# Patient Record
Sex: FEMALE | Race: BLACK OR AFRICAN AMERICAN | Employment: OTHER | URBAN - METROPOLITAN AREA
[De-identification: names, ages, dates, MRNs, and addresses within clinical notes are randomized per-mention and may not be internally consistent; named-entity substitution may affect disease eponyms.]

---

## 2018-07-24 ENCOUNTER — APPOINTMENT (OUTPATIENT)
Dept: GENERAL RADIOLOGY | Age: 58
End: 2018-07-24
Payer: MEDICARE

## 2018-07-24 ENCOUNTER — HOSPITAL ENCOUNTER (EMERGENCY)
Age: 58
Discharge: HOME | End: 2018-07-24
Attending: EMERGENCY MEDICINE
Payer: MEDICARE

## 2018-07-24 VITALS
TEMPERATURE: 97.9 F | HEIGHT: 64 IN | BODY MASS INDEX: 30.73 KG/M2 | OXYGEN SATURATION: 98 % | WEIGHT: 180 LBS | RESPIRATION RATE: 18 BRPM | HEART RATE: 64 BPM

## 2018-07-24 DIAGNOSIS — M79.601 RIGHT ARM PAIN: Primary | ICD-10-CM

## 2018-07-24 PROCEDURE — 73080 X-RAY EXAM OF ELBOW: CPT

## 2018-07-24 PROCEDURE — 99284 EMERGENCY DEPT VISIT MOD MDM: CPT

## 2018-07-24 PROCEDURE — 6370000000 HC RX 637 (ALT 250 FOR IP): Performed by: PHYSICIAN ASSISTANT

## 2018-07-24 PROCEDURE — 93971 EXTREMITY STUDY: CPT

## 2018-07-24 RX ORDER — OXYCODONE HYDROCHLORIDE AND ACETAMINOPHEN 5; 325 MG/1; MG/1
1 TABLET ORAL 2 TIMES DAILY
Qty: 10 TABLET | Refills: 0 | Status: SHIPPED | OUTPATIENT
Start: 2018-07-24 | End: 2018-07-29

## 2018-07-24 RX ORDER — NAPROXEN 500 MG/1
500 TABLET ORAL 2 TIMES DAILY WITH MEALS
Qty: 20 TABLET | Refills: 0 | Status: SHIPPED | OUTPATIENT
Start: 2018-07-24

## 2018-07-24 RX ORDER — OXYCODONE HYDROCHLORIDE AND ACETAMINOPHEN 5; 325 MG/1; MG/1
1 TABLET ORAL ONCE
Status: COMPLETED | OUTPATIENT
Start: 2018-07-24 | End: 2018-07-24

## 2018-07-24 RX ADMIN — OXYCODONE HYDROCHLORIDE AND ACETAMINOPHEN 1 TABLET: 5; 325 TABLET ORAL at 19:26

## 2018-07-24 ASSESSMENT — PAIN DESCRIPTION - ORIENTATION: ORIENTATION: RIGHT;MID;UPPER

## 2018-07-24 ASSESSMENT — PAIN SCALES - GENERAL
PAINLEVEL_OUTOF10: 10
PAINLEVEL_OUTOF10: 10

## 2018-07-24 ASSESSMENT — PAIN DESCRIPTION - LOCATION: LOCATION: ARM

## 2018-07-24 ASSESSMENT — PAIN DESCRIPTION - DESCRIPTORS: DESCRIPTORS: SHARP

## 2018-07-24 ASSESSMENT — PAIN DESCRIPTION - FREQUENCY: FREQUENCY: CONTINUOUS

## 2018-07-24 NOTE — ED NOTES
Right elbow pain past week denies injury concerned because \"I have and artificial heart valve\" no CP resp even non labored did not call cardiologist taking tylenol without relief no swelling redness or deformity to right arm     Estefany Marquez RN  07/24/18 4595

## 2018-07-25 NOTE — ED PROVIDER NOTES
TABLET    Take 1 tablet by mouth daily. SIMVASTATIN (ZOCOR) 20 MG TABLET    Take 1 tablet by mouth nightly. SPIRONOLACTONE (ALDACTONE) 25 MG TABLET    Take 25 mg by mouth daily. PAST MEDICAL HISTORY         Diagnosis Date    Acid reflux     Arthritis     Blurred vision     Chest pain     COMES AND GOES    CHF (congestive heart failure) (HCC)     Chronic low back pain     PAIN BLOCKS    Diverticulitis     Dizziness     Hyperlipidemia     Hypertension     Medically noncompliant     Mitral regurgitation     MR (mitral regurgitation) 14    valve surgery    Nausea     Numbness     LEFT HAND AND ARM    Rash     FINGERS    Snores     TR (tricuspid regurgitation) 14    valve surgery       SURGICAL HISTORY           Procedure Laterality Date    CARDIAC CATHETERIZATION      MITRAL VALVE SURGERY  14    Complex Mitral valve repair with jacinda-medial papillary muscle mobilization    OTHER SURGICAL HISTORY  14    mitral annuloplasty with 28 mm ATS semi-rigid ring    TRANSESOPHAGEAL ECHOCARDIOGRAM  2016    TRICUSPID VALVE SURGERY  14     Tricuspid valve reconstruction and annuloplasty with 32 mm Medtronic Contour ring    TUBAL LIGATION           FAMILY HISTORY       Family History   Problem Relation Age of Onset    Diabetes Mother     Heart Failure Mother         MI    Cancer Mother         ovarian    Diabetes Father     Stroke Father     Hypertension Father      Family Status   Relation Status    Mother Alive    Father         SOCIAL HISTORY      reports that she quit smoking about 4 years ago. She has never used smokeless tobacco. She reports that she drinks alcohol. She reports that she does not use drugs. REVIEW OF SYSTEMS    (2-9 systems for level 4, 10 or more for level 5)   Review of Systems    Except as noted above the remainder of the review of systems was reviewed and negative.      PHYSICAL EXAM    (up to 7 for level 4, 8 or more for examination by me reveals that it is appropriately placed and the right upper extremity is neuro/vasc intact. FINAL IMPRESSION      1. Right arm pain          DISPOSITION/PLAN   DISPOSITION Decision To Discharge 07/24/2018 08:36:20 PM      PATIENT REFERRED TO:   NADINE Yeh - CNP  [address] [address]  [phone]    In 1 day        DISCHARGE MEDICATIONS:     New Prescriptions    NAPROXEN (NAPROSYN) 500 MG TABLET    Take 1 tablet by mouth 2 times daily (with meals)    OXYCODONE-ACETAMINOPHEN (PERCOCET) 5-325 MG PER TABLET    Take 1 tablet by mouth 2 times daily for 5 days. .           (Please note that portions of this note were completed with a voice recognition program.  Efforts were made to edit the dictations but occasionally words are mis-transcribed.)    ANNA Bauman PA-C  07/24/18 2038       Claudette Hankins PA-C  07/24/18 2050

## 2021-09-19 ENCOUNTER — HOSPITAL ENCOUNTER (EMERGENCY)
Age: 61
Discharge: HOME | End: 2021-09-19
Attending: STUDENT IN AN ORGANIZED HEALTH CARE EDUCATION/TRAINING PROGRAM
Payer: MEDICARE

## 2021-09-19 VITALS
SYSTOLIC BLOOD PRESSURE: 140 MMHG | OXYGEN SATURATION: 97 % | BODY MASS INDEX: 39.27 KG/M2 | DIASTOLIC BLOOD PRESSURE: 74 MMHG | HEART RATE: 81 BPM | WEIGHT: 200 LBS | RESPIRATION RATE: 16 BRPM | HEIGHT: 60 IN | TEMPERATURE: 97.7 F

## 2021-09-19 DIAGNOSIS — B02.9 HERPES ZOSTER WITHOUT COMPLICATION: Primary | ICD-10-CM

## 2021-09-19 LAB
ABSOLUTE EOS #: 0.21 K/UL (ref 0–0.44)
ABSOLUTE IMMATURE GRANULOCYTE: 0.02 K/UL (ref 0–0.3)
ABSOLUTE LYMPH #: 1.57 K/UL (ref 1.1–3.7)
ABSOLUTE MONO #: 0.47 K/UL (ref 0.1–1.2)
ALBUMIN SERPL-MCNC: 4 G/DL (ref 3.5–5.2)
ALBUMIN/GLOBULIN RATIO: ABNORMAL (ref 1–2.5)
ALP BLD-CCNC: 91 U/L (ref 35–104)
ALT SERPL-CCNC: 11 U/L (ref 5–33)
AMYLASE: 59 U/L (ref 28–100)
ANION GAP SERPL CALCULATED.3IONS-SCNC: 9 MMOL/L (ref 9–17)
AST SERPL-CCNC: 14 U/L
BASOPHILS # BLD: 1 % (ref 0–2)
BASOPHILS ABSOLUTE: 0.03 K/UL (ref 0–0.2)
BILIRUB SERPL-MCNC: 0.24 MG/DL (ref 0.3–1.2)
BILIRUBIN DIRECT: <0.08 MG/DL
BILIRUBIN, INDIRECT: ABNORMAL MG/DL (ref 0–1)
BUN BLDV-MCNC: 16 MG/DL (ref 8–23)
BUN/CREAT BLD: 14 (ref 9–20)
CALCIUM SERPL-MCNC: 9 MG/DL (ref 8.6–10.4)
CHLORIDE BLD-SCNC: 106 MMOL/L (ref 98–107)
CO2: 27 MMOL/L (ref 20–31)
CREAT SERPL-MCNC: 1.11 MG/DL (ref 0.5–0.9)
DIFFERENTIAL TYPE: NORMAL
EOSINOPHILS RELATIVE PERCENT: 4 % (ref 1–4)
GFR AFRICAN AMERICAN: >60 ML/MIN
GFR NON-AFRICAN AMERICAN: 50 ML/MIN
GFR SERPL CREATININE-BSD FRML MDRD: ABNORMAL ML/MIN/{1.73_M2}
GFR SERPL CREATININE-BSD FRML MDRD: ABNORMAL ML/MIN/{1.73_M2}
GLOBULIN: ABNORMAL G/DL (ref 1.5–3.8)
GLUCOSE BLD-MCNC: 92 MG/DL (ref 70–99)
HCT VFR BLD CALC: 38.6 % (ref 36.3–47.1)
HEMOGLOBIN: 12 G/DL (ref 11.9–15.1)
IMMATURE GRANULOCYTES: 0 %
LIPASE: 40 U/L (ref 13–60)
LYMPHOCYTES # BLD: 32 % (ref 24–43)
MCH RBC QN AUTO: 29.6 PG (ref 25.2–33.5)
MCHC RBC AUTO-ENTMCNC: 31.1 G/DL (ref 28.4–34.8)
MCV RBC AUTO: 95.1 FL (ref 82.6–102.9)
MONOCYTES # BLD: 10 % (ref 3–12)
NRBC AUTOMATED: NORMAL PER 100 WBC
PDW BLD-RTO: 13.7 % (ref 11.8–14.4)
PLATELET # BLD: 220 K/UL (ref 138–453)
PLATELET ESTIMATE: NORMAL
PMV BLD AUTO: 9.8 FL (ref 8.1–13.5)
POTASSIUM SERPL-SCNC: 4.5 MMOL/L (ref 3.7–5.3)
RBC # BLD: 4.06 M/UL (ref 3.95–5.11)
RBC # BLD: NORMAL 10*6/UL
SEG NEUTROPHILS: 53 % (ref 36–65)
SEGMENTED NEUTROPHILS ABSOLUTE COUNT: 2.54 K/UL (ref 1.5–8.1)
SODIUM BLD-SCNC: 142 MMOL/L (ref 135–144)
TOTAL PROTEIN: 7.4 G/DL (ref 6.4–8.3)
WBC # BLD: 4.8 K/UL (ref 3.5–11.3)
WBC # BLD: NORMAL 10*3/UL

## 2021-09-19 PROCEDURE — 83690 ASSAY OF LIPASE: CPT

## 2021-09-19 PROCEDURE — 36415 COLL VENOUS BLD VENIPUNCTURE: CPT

## 2021-09-19 PROCEDURE — 80076 HEPATIC FUNCTION PANEL: CPT

## 2021-09-19 PROCEDURE — 6370000000 HC RX 637 (ALT 250 FOR IP): Performed by: NURSE PRACTITIONER

## 2021-09-19 PROCEDURE — 96372 THER/PROPH/DIAG INJ SC/IM: CPT

## 2021-09-19 PROCEDURE — 6360000002 HC RX W HCPCS: Performed by: NURSE PRACTITIONER

## 2021-09-19 PROCEDURE — 85025 COMPLETE CBC W/AUTO DIFF WBC: CPT

## 2021-09-19 PROCEDURE — 99283 EMERGENCY DEPT VISIT LOW MDM: CPT

## 2021-09-19 PROCEDURE — 80048 BASIC METABOLIC PNL TOTAL CA: CPT

## 2021-09-19 PROCEDURE — 82150 ASSAY OF AMYLASE: CPT

## 2021-09-19 RX ORDER — ERGOCALCIFEROL 1.25 MG/1
50000 CAPSULE ORAL WEEKLY
COMMUNITY

## 2021-09-19 RX ORDER — OXYCODONE HYDROCHLORIDE AND ACETAMINOPHEN 5; 325 MG/1; MG/1
1 TABLET ORAL EVERY 6 HOURS PRN
Qty: 20 TABLET | Refills: 0 | Status: SHIPPED | OUTPATIENT
Start: 2021-09-19 | End: 2021-09-24

## 2021-09-19 RX ORDER — VALACYCLOVIR HYDROCHLORIDE 500 MG/1
1000 TABLET, FILM COATED ORAL ONCE
Status: COMPLETED | OUTPATIENT
Start: 2021-09-19 | End: 2021-09-19

## 2021-09-19 RX ORDER — HYDROMORPHONE HYDROCHLORIDE 1 MG/ML
1 INJECTION, SOLUTION INTRAMUSCULAR; INTRAVENOUS; SUBCUTANEOUS ONCE
Status: COMPLETED | OUTPATIENT
Start: 2021-09-19 | End: 2021-09-19

## 2021-09-19 RX ORDER — VALACYCLOVIR HYDROCHLORIDE 1 G/1
1000 TABLET, FILM COATED ORAL 3 TIMES DAILY
Qty: 21 TABLET | Refills: 0 | Status: SHIPPED | OUTPATIENT
Start: 2021-09-19 | End: 2021-09-26

## 2021-09-19 RX ADMIN — VALACYCLOVIR HYDROCHLORIDE 1000 MG: 500 TABLET, FILM COATED ORAL at 23:11

## 2021-09-19 RX ADMIN — HYDROMORPHONE HYDROCHLORIDE 1 MG: 1 INJECTION, SOLUTION INTRAMUSCULAR; INTRAVENOUS; SUBCUTANEOUS at 21:04

## 2021-09-19 ASSESSMENT — PAIN SCALES - GENERAL
PAINLEVEL_OUTOF10: 10
PAINLEVEL_OUTOF10: 10

## 2021-09-19 ASSESSMENT — ENCOUNTER SYMPTOMS
SORE THROAT: 0
COUGH: 0
DIARRHEA: 0
RHINORRHEA: 0
NAUSEA: 0
WHEEZING: 0
SHORTNESS OF BREATH: 0
SINUS PRESSURE: 0
CONSTIPATION: 0
VOMITING: 0
COLOR CHANGE: 0
ABDOMINAL PAIN: 0

## 2021-09-19 ASSESSMENT — PAIN DESCRIPTION - DESCRIPTORS: DESCRIPTORS: SHARP;CONSTANT

## 2021-09-19 ASSESSMENT — PAIN DESCRIPTION - ORIENTATION: ORIENTATION: RIGHT

## 2021-09-19 ASSESSMENT — PAIN DESCRIPTION - LOCATION: LOCATION: FLANK

## 2021-09-19 ASSESSMENT — PAIN DESCRIPTION - FREQUENCY: FREQUENCY: CONTINUOUS

## 2021-09-20 NOTE — ED NOTES
Pt presents to the er c/o abdominal pain right sided flank pain nausea and a rash      Veronica Amaya RN  09/19/21 0163

## 2021-09-20 NOTE — ED PROVIDER NOTES
[address] [place] [address] ED  eMERGENCY dEPARTMENT eNCOUnter      Pt Name: Luana Zuluaga  MRN: [id number]  Lor[place]fwend[place] 1960  Date of evaluation: 9/19/2021  Provider: Vernal Goodpasture NP, NADINE Rangel [id number]       Chief Complaint   Patient presents with    Flank Pain     right onset 3 days, states stage 3 or 4 kidney failure    Abdominal Pain     RLQ    Nausea    Rash     RLQ         HISTORY OF PRESENT ILLNESS  (Location/Symptom, Timing/Onset, Context/Setting, Quality, Duration, Modifying Factors, Severity.)   Luana Zuluaga is a 57 y.o. female who presents to the emergency department by private vehicle for evaluation of right flank pain that radiates into the right abdomen. She has been on for last 2 days. She is concerned because she has had a history of kidney problems. She is also concerned that it could be her gallbladder. She states is in the right mid back and radiates into the right front. She rates the pain a  10 on a 0-to-10 scale. Nursing Notes were reviewed. ALLERGIES     Latex, Bactrim [sulfamethoxazole-trimethoprim], Eggs or egg-derived products, Pcn [penicillins], Shellfish-derived products, and Vicodin [hydrocodone-acetaminophen]    CURRENT MEDICATIONS       Previous Medications    ALBUTEROL (PROAIR HFA) 108 (90 BASE) MCG/ACT INHALER    Inhale 1 puff into the lungs every 4 hours as needed. ASPIRIN 81 MG EC TABLET    Take 1 tablet by mouth daily. CARVEDILOL (COREG) 3.125 MG TABLET    Take 1 tablet by mouth 2 times daily (with meals). FLUTICASONE (FLONASE) 50 MCG/ACT NASAL SPRAY    2 sprays by Nasal route daily as needed for Rhinitis. FUROSEMIDE (LASIX) 40 MG TABLET      Take 40 mg by mouth 2 times daily     GABAPENTIN (NEURONTIN) 100 MG CAPSULE    Take 1 capsule by mouth 3 times daily. LOSARTAN (COZAAR) 50 MG TABLET    Take 2 tablets by mouth daily.     NAPROXEN (NAPROSYN) 500 MG TABLET    Take 1 tablet by mouth 2 times daily (with meals)    ONDANSETRON (ZOFRAN) 4 MG TABLET    Take 1 tablet by mouth every 8 hours as needed for Nausea or Vomiting. POTASSIUM CHLORIDE SA (K-DUR;KLOR-CON M) 20 MEQ TABLET    Take 1 tablet by mouth daily. SIMVASTATIN (ZOCOR) 20 MG TABLET    Take 1 tablet by mouth nightly. SPIRONOLACTONE (ALDACTONE) 25 MG TABLET    Take 25 mg by mouth daily. VITAMIN D (ERGOCALCIFEROL) 1.25 MG (95981 UT) CAPS CAPSULE    Take 50,000 Units by mouth once a week       PAST MEDICAL HISTORY         Diagnosis Date    Acid reflux     Arthritis     Blurred vision     Chest pain     COMES AND GOES    CHF (congestive heart failure) (HCC)     Chronic low back pain     PAIN BLOCKS    Diverticulitis     Dizziness     Hyperlipidemia     Hypertension     Medically noncompliant     Mitral regurgitation     MR (mitral regurgitation) 14    valve surgery    Nausea     Numbness     LEFT HAND AND ARM    Rash     FINGERS    Snores     TR (tricuspid regurgitation) 14    valve surgery       SURGICAL HISTORY           Procedure Laterality Date    CARDIAC CATHETERIZATION      MITRAL VALVE SURGERY  14    Complex Mitral valve repair with jacinda-medial papillary muscle mobilization    OTHER SURGICAL HISTORY  14    mitral annuloplasty with 28 mm ATS semi-rigid ring    TRANSESOPHAGEAL ECHOCARDIOGRAM  2016    TRICUSPID VALVE SURGERY  14     Tricuspid valve reconstruction and annuloplasty with 32 mm Medtronic Contour ring    TUBAL LIGATION           FAMILY HISTORY           Problem Relation Age of Onset    Diabetes Mother     Heart Failure Mother         MI    Cancer Mother         ovarian    Diabetes Father     Stroke Father     Hypertension Father      Family Status   Relation Name Status    Mother  Alive    Father          SOCIAL HISTORY      reports that she quit smoking about 7 years ago. She has never used smokeless tobacco. She reports previous alcohol use.  She reports that she does not use drugs.    REVIEW OF SYSTEMS    (2-9 systems for level 4, 10 or more for level 5)     Review of Systems   Constitutional: Negative for chills, fever and unexpected weight change. HENT: Negative for congestion, rhinorrhea, sinus pressure and sore throat. Respiratory: Negative for cough, shortness of breath and wheezing. Cardiovascular: Negative for chest pain and palpitations. Gastrointestinal: Negative for abdominal pain, constipation, diarrhea, nausea and vomiting. Genitourinary: Negative for dysuria and hematuria. Musculoskeletal: Negative for arthralgias and myalgias. Skin: Positive for rash. Negative for color change. Neurological: Negative for dizziness, weakness and headaches. Hematological: Negative for adenopathy. All other systems reviewed and are negative. Except as noted above the remainder of the review of systems was reviewed and negative. PHYSICAL EXAM    (up to 7 for level 4, 8 or more for level 5)     ED Triage Vitals [09/19/21 1950]   BP Temp Temp Source Pulse Resp SpO2 Height Weight   (!) 140/74 97.7 °F (36.5 °C) Oral 81 16 97 % 5' (1.524 m) 200 lb (90.7 kg)       Physical Exam  Vitals reviewed. Constitutional:       Appearance: She is well-developed. HENT:      Head: Normocephalic and atraumatic. Eyes:      Conjunctiva/sclera: Conjunctivae normal.      Pupils: Pupils are equal, round, and reactive to light. Cardiovascular:      Rate and Rhythm: Normal rate and regular rhythm. Pulmonary:      Effort: Pulmonary effort is normal. No respiratory distress. Breath sounds: Normal breath sounds. No stridor. Abdominal:      General: Bowel sounds are normal.      Palpations: Abdomen is soft. Musculoskeletal:         General: Normal range of motion. Cervical back: Normal range of motion and neck supple. Lymphadenopathy:      Cervical: No cervical adenopathy. Skin:     General: Skin is warm and dry. Findings: No rash.           Neurological: Mental Status: She is alert and oriented to person, place, and time. LABS:  Labs Reviewed   BASIC METABOLIC PANEL - Abnormal; Notable for the following components:       Result Value    CREATININE 1.11 (*)     GFR Non- 50 (*)     All other components within normal limits   HEPATIC FUNCTION PANEL - Abnormal; Notable for the following components: Total Bilirubin 0.24 (*)     All other components within normal limits   CBC WITH AUTO DIFFERENTIAL   LIPASE   AMYLASE       All other labs were within normal range or not returned as of this dictation. EMERGENCY DEPARTMENT COURSE and DIFFERENTIAL DIAGNOSIS/MDM:   Vitals:    Vitals:    09/19/21 1950   BP: (!) 140/74   Pulse: 81   Resp: 16   Temp: 97.7 °F (36.5 °C)   TempSrc: Oral   SpO2: 97%   Weight: 200 lb (90.7 kg)   Height: 5' (1.524 m)       Medical Decision Making: Patient's labs are completely negative. She will be given Valtrex and Percocet for home as her pain is most likely related to shingles. Follow-up with primary care physician. Medications   HYDROmorphone HCl PF (DILAUDID) injection 1 mg (1 mg IntraMUSCular Given 9/19/21 2104)   valACYclovir (VALTREX) tablet 1,000 mg (1,000 mg Oral Given 9/19/21 2311)     FINAL IMPRESSION      1. Herpes zoster without complication          DISPOSITION/PLAN   DISPOSITION Decision To Discharge 09/19/2021 10:46:55 PM      PATIENT REFERRED TO:   NADINE Mojica CNP  [address] [place] [address]  [phone]    Schedule an appointment as soon as possible for a visit       [facility] ED  [address]  [phone]    If symptoms worsen      DISCHARGE MEDICATIONS:     New Prescriptions    OXYCODONE-ACETAMINOPHEN (PERCOCET) 5-325 MG PER TABLET    Take 1 tablet by mouth every 6 hours as needed for Pain for up to 5 days.     VALACYCLOVIR (VALTREX) 1 G TABLET    Take 1 tablet by mouth 3 times daily for 7 days           (Please note that portions of this note were completed with a voice recognition program.  Efforts were made to edit the dictations but occasionally words are mis-transcribed.)    [address] [facility] PANKAJ, APRN - CNP  Certified Nurse Practitioner         NADINE Dacosta CNP  09/19/21 3864

## 2022-11-24 ENCOUNTER — HOSPITAL ENCOUNTER (EMERGENCY)
Age: 62
Discharge: HOME | End: 2022-11-24
Attending: EMERGENCY MEDICINE
Payer: MEDICARE

## 2022-11-24 ENCOUNTER — APPOINTMENT (OUTPATIENT)
Dept: CT IMAGING | Age: 62
End: 2022-11-24
Payer: MEDICARE

## 2022-11-24 ENCOUNTER — APPOINTMENT (OUTPATIENT)
Dept: GENERAL RADIOLOGY | Age: 62
End: 2022-11-24
Payer: MEDICARE

## 2022-11-24 VITALS
DIASTOLIC BLOOD PRESSURE: 62 MMHG | RESPIRATION RATE: 18 BRPM | TEMPERATURE: 98 F | BODY MASS INDEX: 39.27 KG/M2 | OXYGEN SATURATION: 97 % | SYSTOLIC BLOOD PRESSURE: 118 MMHG | WEIGHT: 200 LBS | HEIGHT: 60 IN | HEART RATE: 84 BPM

## 2022-11-24 DIAGNOSIS — J18.9 PNEUMONIA OF LEFT UPPER LOBE DUE TO INFECTIOUS ORGANISM: Primary | ICD-10-CM

## 2022-11-24 DIAGNOSIS — R04.2 HEMOPTYSIS: ICD-10-CM

## 2022-11-24 LAB
ABSOLUTE EOS #: 0 K/UL (ref 0–0.4)
ABSOLUTE IMMATURE GRANULOCYTE: 0.25 K/UL (ref 0–0.3)
ABSOLUTE LYMPH #: 1.96 K/UL (ref 1–4.8)
ABSOLUTE MONO #: 1.23 K/UL (ref 0.2–0.8)
ALBUMIN SERPL-MCNC: 3.5 G/DL (ref 3.5–5.2)
ALP BLD-CCNC: 113 U/L (ref 35–104)
ALT SERPL-CCNC: 10 U/L (ref 5–33)
ANION GAP SERPL CALCULATED.3IONS-SCNC: 12 MMOL/L (ref 9–17)
ANTI-XA UNFRAC HEPARIN: 0.1 IU/L (ref 0.3–0.7)
AST SERPL-CCNC: 18 U/L
BASOPHILS # BLD: 0 %
BASOPHILS ABSOLUTE: 0 K/UL (ref 0–0.2)
BILIRUB SERPL-MCNC: 0.8 MG/DL (ref 0.3–1.2)
BUN BLDV-MCNC: 14 MG/DL (ref 8–23)
BUN/CREAT BLD: 12 (ref 9–20)
CALCIUM SERPL-MCNC: 8.8 MG/DL (ref 8.6–10.4)
CHLORIDE BLD-SCNC: 99 MMOL/L (ref 98–107)
CO2: 24 MMOL/L (ref 20–31)
CREAT SERPL-MCNC: 1.13 MG/DL (ref 0.5–0.9)
EOSINOPHILS RELATIVE PERCENT: 0 % (ref 1–4)
FLU A ANTIGEN: NEGATIVE
FLU B ANTIGEN: NEGATIVE
GFR SERPL CREATININE-BSD FRML MDRD: 55 ML/MIN/1.73M2
GLUCOSE BLD-MCNC: 104 MG/DL (ref 70–99)
HCT VFR BLD CALC: 37.9 % (ref 36.3–47.1)
HEMOGLOBIN: 12.2 G/DL (ref 11.9–15.1)
IMMATURE GRANULOCYTES: 1 %
INR BLD: 1.2
LACTIC ACID, SEPSIS: 0.8 MMOL/L (ref 0.5–1.9)
LIPASE: 12 U/L (ref 13–60)
LYMPHOCYTES # BLD: 8 % (ref 24–44)
MCH RBC QN AUTO: 30 PG (ref 25.2–33.5)
MCHC RBC AUTO-ENTMCNC: 32.2 G/DL (ref 28.4–34.8)
MCV RBC AUTO: 93.1 FL (ref 82.6–102.9)
MONOCYTES # BLD: 5 % (ref 1–7)
NRBC AUTOMATED: 0 PER 100 WBC
PARTIAL THROMBOPLASTIN TIME: 30.1 SEC (ref 23.9–33.8)
PDW BLD-RTO: 13.6 % (ref 11.8–14.4)
PLATELET # BLD: 199 K/UL (ref 138–453)
PMV BLD AUTO: 9.9 FL (ref 8.1–13.5)
POTASSIUM SERPL-SCNC: 3.7 MMOL/L (ref 3.7–5.3)
PRO-BNP: 402 PG/ML
PROTHROMBIN TIME: 15.6 SEC (ref 11.5–14.2)
RBC # BLD: 4.07 M/UL (ref 3.95–5.11)
SARS-COV-2, RAPID: NOT DETECTED
SEG NEUTROPHILS: 86 % (ref 36–66)
SEGMENTED NEUTROPHILS ABSOLUTE COUNT: 21.06 K/UL (ref 1.8–7.7)
SODIUM BLD-SCNC: 135 MMOL/L (ref 135–144)
SPECIMEN DESCRIPTION: NORMAL
TOTAL PROTEIN: 7.6 G/DL (ref 6.4–8.3)
TROPONIN, HIGH SENSITIVITY: 6 NG/L (ref 0–14)
WBC # BLD: 24.5 K/UL (ref 3.5–11.3)

## 2022-11-24 PROCEDURE — 71260 CT THORAX DX C+: CPT | Performed by: EMERGENCY MEDICINE

## 2022-11-24 PROCEDURE — 87040 BLOOD CULTURE FOR BACTERIA: CPT

## 2022-11-24 PROCEDURE — 84484 ASSAY OF TROPONIN QUANT: CPT

## 2022-11-24 PROCEDURE — 93005 ELECTROCARDIOGRAM TRACING: CPT | Performed by: EMERGENCY MEDICINE

## 2022-11-24 PROCEDURE — 83605 ASSAY OF LACTIC ACID: CPT

## 2022-11-24 PROCEDURE — 96367 TX/PROPH/DG ADDL SEQ IV INF: CPT

## 2022-11-24 PROCEDURE — 2580000003 HC RX 258: Performed by: EMERGENCY MEDICINE

## 2022-11-24 PROCEDURE — 85610 PROTHROMBIN TIME: CPT

## 2022-11-24 PROCEDURE — 99285 EMERGENCY DEPT VISIT HI MDM: CPT

## 2022-11-24 PROCEDURE — 6360000004 HC RX CONTRAST MEDICATION: Performed by: EMERGENCY MEDICINE

## 2022-11-24 PROCEDURE — 85730 THROMBOPLASTIN TIME PARTIAL: CPT

## 2022-11-24 PROCEDURE — 36415 COLL VENOUS BLD VENIPUNCTURE: CPT

## 2022-11-24 PROCEDURE — 6370000000 HC RX 637 (ALT 250 FOR IP): Performed by: EMERGENCY MEDICINE

## 2022-11-24 PROCEDURE — 71045 X-RAY EXAM CHEST 1 VIEW: CPT

## 2022-11-24 PROCEDURE — 96365 THER/PROPH/DIAG IV INF INIT: CPT

## 2022-11-24 PROCEDURE — 85025 COMPLETE CBC W/AUTO DIFF WBC: CPT

## 2022-11-24 PROCEDURE — 6360000002 HC RX W HCPCS: Performed by: EMERGENCY MEDICINE

## 2022-11-24 PROCEDURE — 87635 SARS-COV-2 COVID-19 AMP PRB: CPT

## 2022-11-24 PROCEDURE — 87804 INFLUENZA ASSAY W/OPTIC: CPT

## 2022-11-24 PROCEDURE — 83880 ASSAY OF NATRIURETIC PEPTIDE: CPT

## 2022-11-24 PROCEDURE — 80053 COMPREHEN METABOLIC PANEL: CPT

## 2022-11-24 PROCEDURE — 83690 ASSAY OF LIPASE: CPT

## 2022-11-24 PROCEDURE — 85520 HEPARIN ASSAY: CPT

## 2022-11-24 RX ORDER — DICYCLOMINE HYDROCHLORIDE 10 MG/1
10 CAPSULE ORAL EVERY 6 HOURS PRN
Qty: 10 CAPSULE | Refills: 0 | Status: SHIPPED | OUTPATIENT
Start: 2022-11-24 | End: 2022-11-24 | Stop reason: SDUPTHER

## 2022-11-24 RX ORDER — PREDNISONE 10 MG/1
TABLET ORAL
Qty: 20 TABLET | Refills: 0 | Status: SHIPPED | OUTPATIENT
Start: 2022-11-24 | End: 2022-11-24 | Stop reason: SDUPTHER

## 2022-11-24 RX ORDER — PREDNISONE 10 MG/1
TABLET ORAL
Qty: 20 TABLET | Refills: 0 | Status: SHIPPED | OUTPATIENT
Start: 2022-11-24 | End: 2022-12-04

## 2022-11-24 RX ORDER — DICYCLOMINE HYDROCHLORIDE 10 MG/1
10 CAPSULE ORAL EVERY 6 HOURS PRN
Qty: 10 CAPSULE | Refills: 0 | Status: SHIPPED | OUTPATIENT
Start: 2022-11-24

## 2022-11-24 RX ORDER — LEVOFLOXACIN 500 MG/1
750 TABLET, FILM COATED ORAL DAILY
Qty: 15 TABLET | Refills: 0 | Status: SHIPPED | OUTPATIENT
Start: 2022-11-24 | End: 2022-11-24 | Stop reason: SDUPTHER

## 2022-11-24 RX ORDER — BENZONATATE 100 MG/1
100 CAPSULE ORAL 3 TIMES DAILY PRN
Qty: 21 CAPSULE | Refills: 0 | Status: SHIPPED | OUTPATIENT
Start: 2022-11-24 | End: 2022-12-01

## 2022-11-24 RX ORDER — DICYCLOMINE HYDROCHLORIDE 10 MG/1
10 CAPSULE ORAL ONCE
Status: COMPLETED | OUTPATIENT
Start: 2022-11-24 | End: 2022-11-24

## 2022-11-24 RX ORDER — BENZONATATE 100 MG/1
100 CAPSULE ORAL 3 TIMES DAILY PRN
Qty: 21 CAPSULE | Refills: 0 | Status: SHIPPED | OUTPATIENT
Start: 2022-11-24 | End: 2022-11-24 | Stop reason: SDUPTHER

## 2022-11-24 RX ORDER — SODIUM CHLORIDE 0.9 % (FLUSH) 0.9 %
10 SYRINGE (ML) INJECTION PRN
Status: DISCONTINUED | OUTPATIENT
Start: 2022-11-24 | End: 2022-11-24 | Stop reason: HOSPADM

## 2022-11-24 RX ORDER — LEVOFLOXACIN 500 MG/1
750 TABLET, FILM COATED ORAL DAILY
Qty: 15 TABLET | Refills: 0 | Status: SHIPPED | OUTPATIENT
Start: 2022-11-24 | End: 2022-12-04

## 2022-11-24 RX ORDER — 0.9 % SODIUM CHLORIDE 0.9 %
80 INTRAVENOUS SOLUTION INTRAVENOUS ONCE
Status: COMPLETED | OUTPATIENT
Start: 2022-11-24 | End: 2022-11-24

## 2022-11-24 RX ORDER — BENZONATATE 100 MG/1
200 CAPSULE ORAL ONCE
Status: COMPLETED | OUTPATIENT
Start: 2022-11-24 | End: 2022-11-24

## 2022-11-24 RX ADMIN — AZITHROMYCIN DIHYDRATE 500 MG: 500 INJECTION, POWDER, LYOPHILIZED, FOR SOLUTION INTRAVENOUS at 14:31

## 2022-11-24 RX ADMIN — DICYCLOMINE HYDROCHLORIDE 10 MG: 10 CAPSULE ORAL at 15:27

## 2022-11-24 RX ADMIN — BENZONATATE 200 MG: 100 CAPSULE ORAL at 12:15

## 2022-11-24 RX ADMIN — SODIUM CHLORIDE 80 ML: 9 INJECTION, SOLUTION INTRAVENOUS at 13:44

## 2022-11-24 RX ADMIN — CEFTRIAXONE SODIUM 1000 MG: 1 INJECTION, POWDER, FOR SOLUTION INTRAMUSCULAR; INTRAVENOUS at 14:01

## 2022-11-24 RX ADMIN — IOPAMIDOL 75 ML: 755 INJECTION, SOLUTION INTRAVENOUS at 13:44

## 2022-11-24 RX ADMIN — SODIUM CHLORIDE, PRESERVATIVE FREE 10 ML: 5 INJECTION INTRAVENOUS at 13:44

## 2022-11-24 ASSESSMENT — ENCOUNTER SYMPTOMS
VOMITING: 0
DIARRHEA: 1
NAUSEA: 0
COUGH: 1
SORE THROAT: 0
ABDOMINAL PAIN: 0
BACK PAIN: 0
SHORTNESS OF BREATH: 1
RHINORRHEA: 0

## 2022-11-24 ASSESSMENT — PAIN SCALES - GENERAL: PAINLEVEL_OUTOF10: 10

## 2022-11-24 ASSESSMENT — PAIN DESCRIPTION - FREQUENCY: FREQUENCY: CONTINUOUS

## 2022-11-24 ASSESSMENT — PAIN DESCRIPTION - LOCATION: LOCATION: CHEST

## 2022-11-24 ASSESSMENT — PAIN DESCRIPTION - DESCRIPTORS: DESCRIPTORS: THROBBING

## 2022-11-24 ASSESSMENT — PAIN - FUNCTIONAL ASSESSMENT: PAIN_FUNCTIONAL_ASSESSMENT: 0-10

## 2022-11-24 NOTE — ED PROVIDER NOTES
[facility]  Emergency Department Encounter     Pt Name: Obdulio Lombardo  MRN: [id number]  [place] 1960  Date of evaluation: 11/24/22  PCP:  NADINE Lazo CNP    CHIEF COMPLAINT       Chief Complaint   Patient presents with    Shortness of Breath    Hemoptysis     Onset yesterday    Cough       HISTORY OF PRESENT ILLNESS  (Location/Symptom, Timing/Onset, Context/Setting, Quality, Duration, Modifying Factors, Severity.)    Obdulio Lombardo is a 60 y.o. female who presents with productive cough that initially started on the sticky sputum but is now frankly bloody, shortness of breath, decreased appetite, fatigue, generalized weakness. Has left sided chest soreness whenever she coughs. Patient initially had a mild cough last week that stopped on its own but then over the past 24 to 48 hours she has gotten progressively worse. Small children in the house were diagnosed with RSV last week. Has taken rapid COVID test at home which have been negative. She does have a history of congestive heart failure as well as prior valve replacement and follows with Dr. Ricky Tate. Is not currently anticoagulated. PAST MEDICAL / SURGICAL / SOCIAL / FAMILY HISTORY    has a past medical history of Acid reflux, Arthritis, Blurred vision, Chest pain, CHF (congestive heart failure) (HCC), Chronic low back pain, Diverticulitis, Dizziness, Hyperlipidemia, Hypertension, Medically noncompliant, Mitral regurgitation, MR (mitral regurgitation), Nausea, Numbness, Rash, Snores, and TR (tricuspid regurgitation). has a past surgical history that includes Tubal ligation; Cardiac catheterization (2014); Mitral valve surgery (5/7/14); other surgical history (5/7/14); Tricuspid valve surgery (5/7/14); and transesophageal echocardiogram (1-).     Social History     Socioeconomic History    Marital status: Single     Spouse name: Not on file    Number of children: Not on file Years of education: Not on file    Highest education level: Not on file   Occupational History    Not on file   Tobacco Use    Smoking status: Former     Types: Cigarettes     Quit date: 10/7/2013     Years since quittin.1    Smokeless tobacco: Never   Vaping Use    Vaping Use: Never used   Substance and Sexual Activity    Alcohol use: Not Currently     Comment: social    Drug use: No    Sexual activity: Not Currently   Other Topics Concern    Not on file   Social History Narrative    Not on file     Social Determinants of Health     Financial Resource Strain: Not on file   Food Insecurity: Not on file   Transportation Needs: Not on file   Physical Activity: Not on file   Stress: Not on file   Social Connections: Not on file   Intimate Partner Violence: Not on file   Housing Stability: Not on file       Family History   Problem Relation Age of Onset    Diabetes Mother     Heart Failure Mother         MI    Cancer Mother         ovarian    Diabetes Father     Stroke Father     Hypertension Father        Allergies:    Latex, Bactrim [sulfamethoxazole-trimethoprim], Eggs or egg-derived products, Pcn [penicillins], Shellfish-derived products, and Vicodin [hydrocodone-acetaminophen]    Home Medications:  Prior to Admission medications    Medication Sig Start Date End Date Taking?  Authorizing Provider   levoFLOXacin (LEVAQUIN) 500 MG tablet Take 1.5 tablets by mouth daily for 10 days 22 Yes Lacy Ferraro DO   benzonatate (TESSALON PERLES) 100 MG capsule Take 1 capsule by mouth 3 times daily as needed for Cough 22 Yes Lacy Ferraro DO   predniSONE (DELTASONE) 10 MG tablet Take 4 tablets by mouth once daily for 5 days 22 Yes Lacy Ferraro DO   dicyclomine (BENTYL) 10 MG capsule Take 1 capsule by mouth every 6 hours as needed (cramps) 22  Yes Lacy Ferraro DO   vitamin D (ERGOCALCIFEROL) 1.25 MG (97128 UT) CAPS capsule Take 50,000 Units by mouth once a week Historical Provider, MD   naproxen (NAPROSYN) 500 MG tablet Take 1 tablet by mouth 2 times daily (with meals) 7/24/18   Adele Adkins PA-C   albuterol [facility] HFA) 108 (90 BASE) MCG/ACT inhaler Inhale 1 puff into the lungs every 4 hours as needed. 4/7/15   Arsalan Aguilar MD   ondansetron (ZOFRAN) 4 MG tablet Take 1 tablet by mouth every 8 hours as needed for Nausea or Vomiting. 4/7/15   Arsalan Aguilar MD   gabapentin (NEURONTIN) 100 MG capsule Take 1 capsule by mouth 3 times daily. Patient taking differently: Take 100 mg by mouth 2 times daily Indications: 200 two times a day and 100mg in the middle of the day  1/19/15   Arsalan Aguilar MD   potassium chloride SA (K-DUR;KLOR-CON M) 20 MEQ tablet Take 1 tablet by mouth daily. 6/26/14   Nabeel Basilio MD   carvedilol (COREG) 3.125 MG tablet Take 1 tablet by mouth 2 times daily (with meals). 6/26/14   Nabeel Basilio MD   losartan (COZAAR) 50 MG tablet Take 2 tablets by mouth daily. 5/15/14   Nabeel Basilio MD   aspirin 81 MG EC tablet Take 1 tablet by mouth daily. 5/12/14   Nabeel Basilio MD   simvastatin (ZOCOR) 20 MG tablet Take 1 tablet by mouth nightly. 5/12/14   Nabeel Basilio MD   fluticasone (FLONASE) 50 MCG/ACT nasal spray 2 sprays by Nasal route daily as needed for Rhinitis. Historical Provider, MD   furosemide (LASIX) 40 MG tablet   Take 40 mg by mouth 2 times daily     Historical Provider, MD   spironolactone (ALDACTONE) 25 MG tablet Take 25 mg by mouth daily. Historical Provider, MD       REVIEW OF SYSTEMS    (2-9 systems for level 4, 10 or more for level 5)    Review of Systems   Constitutional:  Positive for appetite change and fatigue. Negative for chills, diaphoresis and fever. HENT:  Positive for congestion. Negative for rhinorrhea and sore throat. Respiratory:  Positive for cough and shortness of breath. Cardiovascular:  Positive for chest pain. Gastrointestinal:  Positive for diarrhea. Negative for abdominal pain, nausea and vomiting. Genitourinary:  Negative for flank pain. Musculoskeletal:  Negative for back pain and myalgias. Neurological:  Positive for weakness. Negative for dizziness, syncope and light-headedness. Hematological:  Does not bruise/bleed easily. All other systems reviewed and are negative. PHYSICAL EXAM   (up to 7 for level 4, 8 or more for level 5)    VITALS:   Vitals:    11/24/22 1132   BP: 118/62   Pulse: 84   Resp: 18   Temp: 98 °F (36.7 °C)   SpO2: 97%   Weight: 200 lb (90.7 kg)   Height: 5' (1.524 m)       Physical Exam  Vitals and nursing note reviewed. Constitutional:       General: She is not in acute distress. Appearance: She is well-developed. She is obese. She is not diaphoretic. HENT:      Head: Normocephalic and atraumatic. Nose: Congestion present. Eyes:      Conjunctiva/sclera: Conjunctivae normal.   Cardiovascular:      Rate and Rhythm: Normal rate and regular rhythm. Heart sounds: Normal heart sounds. Pulmonary:      Effort: Pulmonary effort is normal. No respiratory distress. Breath sounds: Normal breath sounds. No wheezing, rhonchi or rales. Comments: Thick sputum that is yellow with blood with cough  Chest:      Chest wall: Tenderness present. Abdominal:      General: There is no distension. Palpations: Abdomen is soft. Tenderness: There is no abdominal tenderness. Musculoskeletal:         General: Normal range of motion. Cervical back: Normal range of motion. Right lower leg: No edema. Left lower leg: No edema. Skin:     General: Skin is warm and dry. Neurological:      General: No focal deficit present. Mental Status: She is alert.    Psychiatric:         Behavior: Behavior normal.       DIFFERENTIAL  DIAGNOSIS   PLAN (LABS / IMAGING / EKG):  Orders Placed This Encounter   Procedures    COVID-19, Rapid    Flu A/B Ag Detection    Culture, Blood 1    Culture, Blood 1    XR CHEST 1 VIEW    CT CHEST PULMONARY EMBOLISM W CONTRAST    CBC with Auto Differential    Comprehensive Metabolic Panel w/ Reflex to MG    APTT    Protime-INR    Anti-Xa, Unfractionated Heparin    Brain Natriuretic Peptide    Troponin    Lipase    Lactate, Sepsis    Inpatient consult to Hospitalist    EKG 12 Lead    Insert peripheral IV       MEDICATIONS ORDERED:  Orders Placed This Encounter   Medications    benzonatate (TESSALON) capsule 200 mg    cefTRIAXone (ROCEPHIN) 1,000 mg in dextrose 5 % 50 mL IVPB mini-bag     Order Specific Question:   Antimicrobial Indications     Answer:   Pneumonia (CAP)    azithromycin (ZITHROMAX) 500 mg in D5W 250ml addavial     Order Specific Question:   Antimicrobial Indications     Answer:   Pneumonia (CAP)    0.9 % sodium chloride bolus    iopamidol (ISOVUE-370) 76 % injection 75 mL    sodium chloride flush 0.9 % injection 10 mL    dicyclomine (BENTYL) capsule 10 mg    levoFLOXacin (LEVAQUIN) 500 MG tablet     Sig: Take 1.5 tablets by mouth daily for 10 days     Dispense:  15 tablet     Refill:  0    benzonatate (TESSALON PERLES) 100 MG capsule     Sig: Take 1 capsule by mouth 3 times daily as needed for Cough     Dispense:  21 capsule     Refill:  0    predniSONE (DELTASONE) 10 MG tablet     Sig: Take 4 tablets by mouth once daily for 5 days     Dispense:  20 tablet     Refill:  0    dicyclomine (BENTYL) 10 MG capsule     Sig: Take 1 capsule by mouth every 6 hours as needed (cramps)     Dispense:  10 capsule     Refill:  0     DIAGNOSTIC RESULTS / EMERGENCYDEPARTMENT COURSE / MDM   LABS:  Labs Reviewed   CBC WITH AUTO DIFFERENTIAL - Abnormal; Notable for the following components:       Result Value    WBC 24.5 (*)     Seg Neutrophils 86 (*)     Lymphocytes 8 (*)     Eosinophils % 0 (*)     Immature Granulocytes 1 (*)     Segs Absolute 21.06 (*)     Absolute Mono # 1.23 (*)     All other components within normal limits   COMPREHENSIVE METABOLIC PANEL W/ REFLEX TO MG FOR LOW K - Abnormal; Notable for the following components:    Glucose 104 (*)     Creatinine 1.13 (*)     Est, Glom Filt Rate 55 (*)     Alkaline Phosphatase 113 (*)     All other components within normal limits   PROTIME-INR - Abnormal; Notable for the following components:    Protime 15.6 (*)     All other components within normal limits   ANTI-XA, UNFRACTIONATED HEPARIN - Abnormal; Notable for the following components:    Anti-XA Unfrac Heparin 0.10 (*)     All other components within normal limits   BRAIN NATRIURETIC PEPTIDE - Abnormal; Notable for the following components:    Pro- (*)     All other components within normal limits   LIPASE - Abnormal; Notable for the following components:    Lipase 12 (*)     All other components within normal limits   COVID-19, RAPID   RAPID INFLUENZA A/B ANTIGENS   CULTURE, BLOOD 1   CULTURE, BLOOD 1   APTT   TROPONIN   LACTATE, SEPSIS   LACTATE, SEPSIS       RADIOLOGY:  XR CHEST 1 VIEW    Result Date: 11/24/2022  EXAMINATION: ONE XRAY VIEW OF THE CHEST 11/24/2022 12:23 pm COMPARISON: 10/12/2015 HISTORY: ORDERING SYSTEM PROVIDED HISTORY: SOB cough TECHNOLOGIST PROVIDED HISTORY: SOB cough Reason for Exam: SOB and Coughing. FINDINGS: Median sternotomy wires are noted. Heart size is within normal limits. No significant vascular congestion. There is focal airspace opacity in the left upper lung and mild patchy opacities at the left base. Right lung is clear. No evidence of pneumothorax or pleural effusion. Focal opacity in the left upper lung and mild patchy opacities at the left base, most suggestive of multifocal pneumonia. Follow-up to resolution is recommended. CT CHEST PULMONARY EMBOLISM W CONTRAST    Result Date: 11/24/2022  EXAMINATION: CTA OF THE CHEST 11/24/2022 1:11 pm TECHNIQUE: CTA of the chest was performed after the administration of intravenous contrast.  Multiplanar reformatted images are provided for review. MIP images are provided for review.  Automated exposure control, iterative reconstruction, and/or weight based adjustment of the mA/kV was utilized to reduce the radiation dose to as low as reasonably achievable. COMPARISON: CT chest dated 12/05/2013 HISTORY: ORDERING SYSTEM PROVIDED HISTORY: cough SOB hemoptysis TECHNOLOGIST PROVIDED HISTORY: cough SOB hemoptysis Decision Support Exception - unselect if not a suspected or confirmed emergency medical condition->Emergency Medical Condition (MA) Reason for Exam: Shortness of Breath; Hemoptysis; Cough FINDINGS: Pulmonary Arteries: Pulmonary arteries are adequately opacified for evaluation. No evidence of intraluminal filling defect to suggest pulmonary embolism. Main pulmonary artery is normal in caliber. Mediastinum: No evidence of mediastinal lymphadenopathy. The heart and pericardium demonstrate no acute abnormality. There is no acute abnormality of the thoracic aorta. Lungs/pleura: There is mild centrilobular emphysema. Right lung is clear. There is focal airspace opacity in the anterior left upper lobe. There is trace left pleural fluid and mild left basilar atelectasis. No pneumothorax. Upper Abdomen: Limited images of the upper abdomen are unremarkable. Soft Tissues/Bones: No acute bone or soft tissue abnormality. 1.  No evidence of pulmonary embolism. 2.  Focal airspace opacity in the anterior left upper lobe, most likely pneumonia. Follow-up to resolution is recommended. 3.  Trace left pleural fluid and mild left basilar atelectasis. 4.  Mild centrilobular emphysema. EKG    EKG Interpretation    Interpreted by emergency department physician    Rhythm: normal sinus   Rate: normal  Axis: normal  Ectopy: none  Conduction: normal  ST Segments: no acute change  T Waves: non specific changes  Q Waves: none    Clinical Impression: non-specific EKG. Most recent EKG for comparison from 2014.      All EKG's are interpreted by the Emergency Department Physician whoeither signs or Co-signs this chart in the absence of a cardiologist.    EMERGENCY DEPARTMENT COURSE:  ED Course as of 11/24/22 1541   Thu Nov 24, 2022   1212 NELL 01/2016:  Left ventricular systolic function is normal to low normal.  Normal right ventricular size and function. Mitral leaflets are mildly thickened without stenosis. Mild to moderate mitral regurgitation. Tricuspid valve leaflets appear to be normal.  Mild to moderate tricuspid regurgitation. [AO]   1229 CBC with Auto Differential(!):    WBC 24.5(!)   RBC 4.07   Hemoglobin Quant 12.2   Hematocrit 37.9   MCV 93.1   MCH 30.0   MCHC 32.2   RDW 13.6   Platelet Count 870   MPV 9.9   NRBC Automated 0.0   Seg Neutrophils PENDING   Lymphocytes PENDING   Monocytes PENDING   Eosinophils % PENDING   Basophils PENDING   Immature Granulocytes PENDING   Segs Absolute PENDING   Absolute Lymph # PENDING   Absolute Mono # PENDING   Absolute Eos # PENDING   Basophils Absolute PENDING   Absolute Immature Granulocyte PENDING [AO]   1218 Flu A/B Ag Detection:    Flu A Antigen NEGATIVE   Flu B Antigen NEGATIVE [AO]   1233 COVID-19, Rapid:    Specimen Description . NASOPHARYNGEAL SWAB   SARS-CoV-2, Rapid Not Detected [AO]   1251 Lipase(!): 12 [AO]   1251 Troponin, High Sensitivity: 6 [AO]   1251 Pro-BNP(!): 402 [AO]   1252 Comprehensive Metabolic Panel w/ Reflex to MG(!):    Glucose, Random 104(!)   BUN,BUNPL 14   Creatinine 1.13(!)   Est, Glom Filt Rate 55(!)   Bun/Cre Ratio 12   CALCIUM, SERUM, 350393 8.8   Sodium 135   Potassium 3.7   Chloride 99   CO2 24   Anion Gap 12   Alk Phos 113(!)   ALT 10   AST 18   Bilirubin 0.8   Total Protein 7.6   Albumin 3.5  Baseline Cr [AO]   1252 INR: 1.2 [AO]   1253 PTT: 30.1 [AO]   1312 Anti-Xa, Unfractionated Heparin(!):    Anti-XA Unfrac Heparin 0.10(!) [AO]   1403 XR CHEST 1 VIEW [AO]   1513 CT CHEST PULMONARY EMBOLISM W CONTRAST [AO]      ED Course User Index  [AO] Blanca Newell Petersen 1721, DO       MDM  Number of Diagnoses or Management Options  Hemoptysis  Pneumonia of left upper lobe due to infectious organism  Diagnosis management comments: 69-year-old female presents emergency department with cough, generalized malaise, hemoptysis. Initial evaluation she is in no respiratory distress. Vital signs stable. Chest x-ray concerning for left-sided pneumonia. White count 24. CT negative for PE, however redemonstrated pneumonia with small pleural effusion. No obvious identifiable abscess. Other labs including cardiac work-up unremarkable. Was also complaining of some diarrhea, however pancreatic liver and renal function all within normal limits and she was nontender on exam.  Discussed admission with patient due to her age, hemoptysis, white count, extensive pneumonia. Patient states that she wants to go home and rest in her own bed and would prefer to go home on antibiotics. She is present with family members who can bring her back to the emergency department if complications arise. She did get a dose of azithromycin and Rocephin in the emergency department and blood cultures were sent. Discharged home on Levaquin, steroids, Tessalon Perles. Bentyl for diarrhea       Amount and/or Complexity of Data Reviewed  Clinical lab tests: ordered and reviewed  Tests in the radiology section of CPT®: ordered and reviewed  Review and summarize past medical records: yes  Independent visualization of images, tracings, or specimens: yes    Patient Progress  Patient progress: stable         PROCEDURES:  Procedures     CONSULTS:  IP CONSULT TO HOSPITALIST    CRITICAL CARE:  NONE    FINAL IMPRESSION     1. Pneumonia of left upper lobe due to infectious organism    2. Hemoptysis        DISPOSITION / PLAN   DISPOSITION Decision To Discharge 11/24/2022 03:32:06 PM      Evaluation and treatment course in the ED, and plan of care upon discharge was discussed in length with the patient.   Patient had no further questions prior to being discharged and was instructed to return to the ED for new or worsening symptoms. Any changes to existing medications or new prescriptions were reviewed with patient and they expressed understanding of how to correctly take their medications and the possible side effects. PATIENT REFERRED TO:  NADINE Schneider - CNP  [address] [facility]  [address]          [facility] ([place], [place]) ED  [address] [facility]  [phone]    As needed, If symptoms worsen    DISCHARGE MEDICATIONS:  New Prescriptions    BENZONATATE (TESSALON PERLES) 100 MG CAPSULE    Take 1 capsule by mouth 3 times daily as needed for Cough    DICYCLOMINE (BENTYL) 10 MG CAPSULE    Take 1 capsule by mouth every 6 hours as needed (cramps)    LEVOFLOXACIN (LEVAQUIN) 500 MG TABLET    Take 1.5 tablets by mouth daily for 10 days    PREDNISONE (DELTASONE) 10 MG TABLET    Take 4 tablets by mouth once daily for 5 days       Layc Ardon DO  Emergency Medicine Physician    (Please note that portions of this note were completed with a voice recognition program.  Efforts were made to edit the dictations but occasionally words are mis-transcribed.)        Blanca Petersen 1721,   11/24/22 9340

## 2022-11-26 LAB
EKG ATRIAL RATE: 85 BPM
EKG P AXIS: 29 DEGREES
EKG P-R INTERVAL: 152 MS
EKG Q-T INTERVAL: 348 MS
EKG QRS DURATION: 80 MS
EKG QTC CALCULATION (BAZETT): 414 MS
EKG R AXIS: -13 DEGREES
EKG T AXIS: 18 DEGREES
EKG VENTRICULAR RATE: 85 BPM

## 2022-11-26 PROCEDURE — 93010 ELECTROCARDIOGRAM REPORT: CPT | Performed by: INTERNAL MEDICINE

## 2024-12-10 ENCOUNTER — HOSPITAL ENCOUNTER (OUTPATIENT)
Dept: MAMMOGRAPHY | Age: 64
Discharge: HOME | End: 2024-12-12
Payer: MEDICARE

## 2024-12-10 DIAGNOSIS — Z12.31 ENCOUNTER FOR SCREENING MAMMOGRAM FOR MALIGNANT NEOPLASM OF BREAST: ICD-10-CM

## 2024-12-10 PROCEDURE — 77063 BREAST TOMOSYNTHESIS BI: CPT
